# Patient Record
Sex: FEMALE | Race: WHITE
[De-identification: names, ages, dates, MRNs, and addresses within clinical notes are randomized per-mention and may not be internally consistent; named-entity substitution may affect disease eponyms.]

---
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
## 2023-10-11 ENCOUNTER — EMERGENCY (EMERGENCY)
Facility: HOSPITAL | Age: 23
LOS: 0 days | Discharge: HOME | End: 2023-10-11
Attending: EMERGENCY MEDICINE
Payer: SELF-PAY

## 2023-10-11 VITALS
WEIGHT: 154.98 LBS | HEART RATE: 82 BPM | TEMPERATURE: 98 F | RESPIRATION RATE: 18 BRPM | DIASTOLIC BLOOD PRESSURE: 93 MMHG | OXYGEN SATURATION: 100 % | SYSTOLIC BLOOD PRESSURE: 142 MMHG

## 2023-10-11 DIAGNOSIS — R07.89 OTHER CHEST PAIN: ICD-10-CM

## 2023-10-11 PROCEDURE — 71046 X-RAY EXAM CHEST 2 VIEWS: CPT | Mod: 26

## 2023-10-11 PROCEDURE — 99284 EMERGENCY DEPT VISIT MOD MDM: CPT

## 2023-10-11 PROCEDURE — 99053 MED SERV 10PM-8AM 24 HR FAC: CPT

## 2023-10-11 PROCEDURE — 99283 EMERGENCY DEPT VISIT LOW MDM: CPT | Mod: 25

## 2023-10-11 PROCEDURE — 71046 X-RAY EXAM CHEST 2 VIEWS: CPT

## 2023-10-11 PROCEDURE — 93010 ELECTROCARDIOGRAM REPORT: CPT

## 2023-10-11 PROCEDURE — 93005 ELECTROCARDIOGRAM TRACING: CPT

## 2023-10-11 RX ORDER — IBUPROFEN 200 MG
600 TABLET ORAL ONCE
Refills: 0 | Status: COMPLETED | OUTPATIENT
Start: 2023-10-11 | End: 2023-10-11

## 2023-10-11 RX ADMIN — Medication 600 MILLIGRAM(S): at 02:58

## 2023-10-11 NOTE — ED ADULT TRIAGE NOTE - CHIEF COMPLAINT QUOTE
Pt presents to the ED w/ c/o of midsternal chest pain x3 days. Pt states pain is a random sharp pain that comes and goes and makes her feel like she cannot catch her breath.

## 2023-10-11 NOTE — ED PROVIDER NOTE - PATIENT PORTAL LINK FT
You can access the FollowMyHealth Patient Portal offered by [facility] by registering at the following website: [email or web address]. By joining [employer]’s FollowMyHealth portal, you will also be able to view your health information using other applications (apps) compatible with our system.

## 2023-10-11 NOTE — ED PROVIDER NOTE - NSFOLLOWUPINSTRUCTIONS_ED_ALL_ED_FT
Our Emergency Department Referral Coordinators will be reaching out to you in the next 24-48 hours from 9:00am to 5:00pm with a follow up appointment. Please expect a phone call from the hospital in that time frame. If you do not receive a call or if you have any questions or concerns, you can reach them at   [phone]    Chest Pain    Chest pain can be caused by many different conditions which may or may not be dangerous. Causes include heartburn, lung infections, heart attack, blood clot in lungs, skin infections, strain or damage to muscle, cartilage, or bones, etc. In addition to a history and physical examination, an electrocardiogram (ECG) or other lab tests may have been performed to determine the cause of your chest pain. Follow up with your primary care provider or with a cardiologist as instructed.     SEEK IMMEDIATE MEDICAL CARE IF YOU HAVE ANY OF THE FOLLOWING SYMPTOMS: worsening chest pain, coughing up blood, unexplained back/neck/jaw pain, severe abdominal pain, dizziness or lightheadedness, fainting, shortness of breath, sweaty or clammy skin, vomiting, or racing heart beat. These symptoms may represent a serious problem that is an emergency. Do not wait to see if the symptoms will go away. Get medical help right away. Call 911 and do not drive yourself to the hospital.

## 2023-10-11 NOTE — ED ADULT NURSE NOTE - CHIEF COMPLAINT
Worsening carpal tunnel syndrome of right wrist with mild atrophy of thenar muscles on the right side.  She will need surgical treatment as soon as possible.  Given 1 dose of Medrol Dosepak for temporary inflammation and pain relief.   The patient is a 22y Female complaining of chest pain.

## 2023-10-11 NOTE — ED PROVIDER NOTE - CLINICAL SUMMARY MEDICAL DECISION MAKING FREE TEXT BOX
Patient has normal EKG and CXR, no risk factors for ischemic heart disease, is PERC negative without indication for additional labs or imaging to rule out PE as she is low risk by well's PE score. XR without PNA, PTX.    Though etiology of pain is unclear, as above, no indication for additional labs, imaging, monitoring, observation or admission.    Will dc home with cardiology follow up, monitoring, return precautions.

## 2023-10-11 NOTE — ED PROVIDER NOTE - PHYSICAL EXAMINATION
VITAL SIGNS: I have reviewed nursing notes and confirm.  CONSTITUTIONAL: Well-developed; well-nourished; in no acute distress.  SKIN: Skin exam is warm and dry, no acute rash.  HEAD: Normocephalic; atraumatic.  EYES: PERRL, EOM intact; conjunctiva and sclera clear.  ENT: No nasal discharge; airway clear.   CARD: S1, S2 normal; no murmurs, gallops, or rubs. Regular rate and rhythm.  RESP: No wheezes, rales or rhonchi.  ABD: Normal bowel sounds; soft; non-distended; non-tender; no hepatosplenomegaly.  EXT: Normal ROM. No calf ttp, swelling  NEURO: Alert, oriented. Grossly unremarkable. No focal deficits.  PSYCH: Cooperative, appropriate.

## 2023-10-11 NOTE — CHART NOTE - NSCHARTNOTEFT_GEN_A_CORE
[facility] MRN [id number] / Pt communicated that she would like to self schedule her cardiology appt 10/11 - JL

## 2023-10-11 NOTE — ED PROVIDER NOTE - OBJECTIVE STATEMENT
21 yo F, no PMHX, non smoker, here for assessment of mid sternal CP. Pain sharp, intermittent, began Monday, last seconds. No associated dyspnea, nausea, vomiting, dizziness. No recent travel, sick contacts. No hx of PE or DVT. No FH of premature CAD, sudden cardiac death, PE/DVT. Patient does not use OCPs, no recent surgery, immobilization. No previous cardiac eval.

## 2023-10-11 NOTE — ED ADULT NURSE NOTE - NSFALLUNIVINTERV_ED_ALL_ED
Bed/Stretcher in lowest position, wheels locked, appropriate side rails in place/Call bell, personal items and telephone in reach/Instruct patient to call for assistance before getting out of bed/chair/stretcher/Non-slip footwear applied when patient is off stretcher/[place] to call system/Physically safe environment - no spills, clutter or unnecessary equipment/Purposeful proactive rounding/Room/bathroom lighting operational, light cord in reach

## 2024-09-17 NOTE — ED ADULT NURSE NOTE - CAS EDP DISCH TYPE
Patient Education     Well Child Exam 11 to 14 Years   About this topic   Your child's well child exam is a visit with the doctor to check your child's health. The doctor measures your child's weight and height, and may measure your child's body mass index (BMI). The doctor plots these numbers on a growth curve. The growth curve gives a picture of your child's growth at each visit. The doctor may listen to your child's heart, lungs, and belly. Your doctor will do a full exam of your child from the head to the toes.  Your child may also need shots or blood tests during this visit.  General   Growth and Development   Your doctor will ask you how your child is developing. The doctor will focus on the skills that most children your child's age are expected to do. During this time of your child's life, here are some things you can expect.  Physical development - Your child may:  Show signs of maturing physically  Need reminders about drinking water when playing  Be a little clumsy while growing  Hearing, seeing, and talking - Your child may:  Be able to see the long-term effects of actions  Understand many viewpoints  Begin to question and challenge existing rules  Want to help set household rules  Feelings and behavior - Your child may:  Want to spend time alone or with friends rather than with family  Have an interest in dating and the opposite sex  Value the opinions of friends over parents' thoughts or ideas  Want to push the limits of what is allowed  Believe bad things won’t happen to them  Feeding - Your child needs:  To learn to make healthy choices when eating. Serve healthy foods like lean meats, fruits, vegetables, and whole grains. Help your child choose healthy foods when out to eat.  To start each day with a healthy breakfast  To limit soda, chips, candy, and foods that are high in fats and sugar  Healthy snacks available like fruit, cheese and crackers, or peanut butter  To eat meals as a part of the  family. Turn the TV and cell phones off while eating. Talk about your day, rather than focusing on what your child is eating.  Sleep - Your child:  Needs more sleep  Is likely sleeping about 8 to 10 hours in a row at night  Should be allowed to read each night before bed. Have your child brush and floss the teeth before going to bed as well.  Should limit TV and computers for the hour before bedtime  Keep cell phones, tablets, televisions, and other electronic devices out of bedrooms overnight. They interfere with sleep.  Needs a routine to make week nights easier. Encourage your child to get up at a normal time on weekends instead of sleeping late.  Shots or vaccines - It is important for your child to get shots on time. This protects your child from very serious illnesses like pneumonia, blood and brain infections, tetanus, flu, or cancer. Your child may need:  HPV or human papillomavirus vaccine  Tdap or tetanus, diphtheria, and pertussis vaccine  Meningococcal vaccine  Influenza vaccine  COVID-19 vaccine  Help for Parents   Activities.  Encourage your child to spend at least 1 hour each day being physically active.  Offer your child a variety of activities to take part in. Include music, sports, arts and crafts, and other things your child is interested in. Take care not to over schedule your child. One to 2 activities a week outside of school is often a good number for your child.  Make sure your child wears a helmet when using anything with wheels like skates, skateboard, bike, etc.  Encourage time spent with friends. Provide a safe area for this.  Here are some things you can do to help keep your child safe and healthy.  Talk to your child about the dangers of smoking, drinking alcohol, and using drugs. Do not allow anyone to smoke in your home or around your child.  Make sure your child uses a seat belt when riding in the car. Your child should ride in the back seat until 13 years of age.  Talk with your  child about peer pressure. Help your child learn how to handle risky things friends may want to do.  Remind your child to use headphones responsibly. Limit how loud the volume is turned up. Never wear headphones, text, or use a cell phone while riding a bike or crossing the street.  Protect your child from gun injuries. If you have a gun, use a trigger lock. Keep the gun locked up and the bullets kept in a separate place.  Limit screen time for children to 1 to 2 hours per day. This includes TV, phones, computers, and video games.  Discuss social media safety  Parents need to think about:  Monitoring your child's computer use, especially when on the Internet  How to keep open lines of communication about unwanted touch, sex, and dating  How to continue to talk about puberty  Having your child help with some family chores to encourage responsibility within the family  Helping children make healthy choices  The next well child visit will most likely be in 1 year. At this visit, your doctor may:  Do a full check up on your child  Talk about school, friends, and social skills  Talk about sexuality and sexually transmitted diseases  Talk about driving and safety  When do I need to call the doctor?   Fever of 100.4°F (38°C) or higher  Your child has not started puberty by age 14  Low mood, suddenly getting poor grades, or missing school  You are worried about your child's development  Last Reviewed Date   2021-11-04  Consumer Information Use and Disclaimer   This generalized information is a limited summary of diagnosis, treatment, and/or medication information. It is not meant to be comprehensive and should be used as a tool to help the user understand and/or assess potential diagnostic and treatment options. It does NOT include all information about conditions, treatments, medications, side effects, or risks that may apply to a specific patient. It is not intended to be medical advice or a substitute for the medical  advice, diagnosis, or treatment of a health care provider based on the health care provider's examination and assessment of a patient’s specific and unique circumstances. Patients must speak with a health care provider for complete information about their health, medical questions, and treatment options, including any risks or benefits regarding use of medications. This information does not endorse any treatments or medications as safe, effective, or approved for treating a specific patient. [employer] and its affiliates disclaim any warranty or liability relating to this information or the use thereof. The use of this information is governed by the Terms of Use, available at https://www.[employer].com/en/know/clinical-effectiveness-terms   Copyright   Copyright © 2024 [employer] and its affiliates and/or licensors. All rights reserved.     Home